# Patient Record
Sex: FEMALE | Race: WHITE | URBAN - METROPOLITAN AREA
[De-identification: names, ages, dates, MRNs, and addresses within clinical notes are randomized per-mention and may not be internally consistent; named-entity substitution may affect disease eponyms.]

---

## 2017-03-22 ENCOUNTER — HOSPITAL ENCOUNTER (OUTPATIENT)
Dept: OTHER | Facility: HOSPITAL | Age: 47
Setting detail: SPECIMEN
Discharge: HOME | End: 2017-03-22
Attending: PODIATRIST | Admitting: PODIATRIST

## 2017-03-22 LAB
BACTERIA SPEC AEROBE CULT: NORMAL
GRAM STN SPEC: NORMAL
Lab: NORMAL
MICRO REPORT STATUS: NORMAL
SPECIMEN SOURCE: NORMAL

## 2017-03-23 LAB
BACTERIA SPEC AEROBE CULT: NORMAL
Lab: NORMAL
MICRO REPORT STATUS: NORMAL
SPECIMEN SOURCE: NORMAL

## 2024-12-09 ENCOUNTER — APPOINTMENT (OUTPATIENT)
Dept: CT IMAGING | Facility: HOSPITAL | Age: 54
End: 2024-12-09
Payer: COMMERCIAL

## 2024-12-09 ENCOUNTER — APPOINTMENT (OUTPATIENT)
Dept: MRI IMAGING | Facility: HOSPITAL | Age: 54
End: 2024-12-09
Payer: COMMERCIAL

## 2024-12-09 ENCOUNTER — HOSPITAL ENCOUNTER (OUTPATIENT)
Facility: HOSPITAL | Age: 54
Setting detail: OBSERVATION
Discharge: HOME | End: 2024-12-11
Attending: EMERGENCY MEDICINE | Admitting: HOSPITALIST
Payer: COMMERCIAL

## 2024-12-09 DIAGNOSIS — R11.10 VOMITING AND DIARRHEA: Primary | ICD-10-CM

## 2024-12-09 DIAGNOSIS — R10.10 ACUTE UPPER ABDOMINAL PAIN: ICD-10-CM

## 2024-12-09 DIAGNOSIS — R19.7 VOMITING AND DIARRHEA: Primary | ICD-10-CM

## 2024-12-09 DIAGNOSIS — E83.42 HYPOMAGNESEMIA: ICD-10-CM

## 2024-12-09 DIAGNOSIS — E87.6 HYPOKALEMIA: ICD-10-CM

## 2024-12-09 DIAGNOSIS — E83.39 HYPOPHOSPHATEMIA: ICD-10-CM

## 2024-12-09 DIAGNOSIS — N28.89 LEFT RENAL MASS: ICD-10-CM

## 2024-12-09 PROBLEM — R11.2 NAUSEA VOMITING AND DIARRHEA: Status: ACTIVE | Noted: 2024-12-09

## 2024-12-09 LAB
ADV 40+41 DNA STL QL NAA+NON-PROBE: NOT DETECTED
ALBUMIN SERPL-MCNC: 1.9 G/DL (ref 3.5–5.2)
ALBUMIN/GLOB SERPL: 1.3 G/DL
ALP SERPL-CCNC: 36 U/L (ref 39–117)
ALT SERPL W P-5'-P-CCNC: 11 U/L (ref 1–33)
ANION GAP SERPL CALCULATED.3IONS-SCNC: 7.6 MMOL/L (ref 5–15)
AST SERPL-CCNC: 15 U/L (ref 1–32)
ASTRO TYP 1-8 RNA STL QL NAA+NON-PROBE: NOT DETECTED
BACTERIA UR QL AUTO: ABNORMAL /HPF
BASOPHILS # BLD AUTO: 0.01 10*3/MM3 (ref 0–0.2)
BASOPHILS NFR BLD AUTO: 0.1 % (ref 0–1.5)
BILIRUB SERPL-MCNC: 0.6 MG/DL (ref 0–1.2)
BILIRUB UR QL STRIP: NEGATIVE
BUN SERPL-MCNC: 7 MG/DL (ref 6–20)
BUN/CREAT SERPL: 17.1 (ref 7–25)
C CAYETANENSIS DNA STL QL NAA+NON-PROBE: NOT DETECTED
C COLI+JEJ+UPSA DNA STL QL NAA+NON-PROBE: NOT DETECTED
CA-I SERPL ISE-MCNC: 1.18 MMOL/L (ref 1.15–1.35)
CALCIUM SPEC-SCNC: 5.6 MG/DL (ref 8.6–10.5)
CHLORIDE SERPL-SCNC: 115 MMOL/L (ref 98–107)
CLARITY UR: CLEAR
CO2 SERPL-SCNC: 19.4 MMOL/L (ref 22–29)
COLOR UR: YELLOW
CREAT SERPL-MCNC: 0.41 MG/DL (ref 0.57–1)
CRYPTOSP DNA STL QL NAA+NON-PROBE: NOT DETECTED
DEPRECATED RDW RBC AUTO: 41.2 FL (ref 37–54)
E HISTOLYT DNA STL QL NAA+NON-PROBE: NOT DETECTED
EAEC PAA PLAS AGGR+AATA ST NAA+NON-PRB: NOT DETECTED
EC STX1+STX2 GENES STL QL NAA+NON-PROBE: NOT DETECTED
EGFRCR SERPLBLD CKD-EPI 2021: 117.8 ML/MIN/1.73
EOSINOPHIL # BLD AUTO: 0.23 10*3/MM3 (ref 0–0.4)
EOSINOPHIL NFR BLD AUTO: 2.1 % (ref 0.3–6.2)
EPEC EAE GENE STL QL NAA+NON-PROBE: NOT DETECTED
ERYTHROCYTE [DISTWIDTH] IN BLOOD BY AUTOMATED COUNT: 12 % (ref 12.3–15.4)
ETEC LTA+ST1A+ST1B TOX ST NAA+NON-PROBE: NOT DETECTED
G LAMBLIA DNA STL QL NAA+NON-PROBE: NOT DETECTED
GLOBULIN UR ELPH-MCNC: 1.5 GM/DL
GLUCOSE SERPL-MCNC: 73 MG/DL (ref 65–99)
GLUCOSE UR STRIP-MCNC: NEGATIVE MG/DL
HCT VFR BLD AUTO: 38.6 % (ref 34–46.6)
HGB BLD-MCNC: 13.2 G/DL (ref 12–15.9)
HGB UR QL STRIP.AUTO: NEGATIVE
HYALINE CASTS UR QL AUTO: ABNORMAL /LPF
IMM GRANULOCYTES # BLD AUTO: 0.07 10*3/MM3 (ref 0–0.05)
IMM GRANULOCYTES NFR BLD AUTO: 0.6 % (ref 0–0.5)
KETONES UR QL STRIP: ABNORMAL
LEUKOCYTE ESTERASE UR QL STRIP.AUTO: ABNORMAL
LIPASE SERPL-CCNC: 44 U/L (ref 13–60)
LYMPHOCYTES # BLD AUTO: 1.46 10*3/MM3 (ref 0.7–3.1)
LYMPHOCYTES NFR BLD AUTO: 13.1 % (ref 19.6–45.3)
MAGNESIUM SERPL-MCNC: 1.2 MG/DL (ref 1.6–2.6)
MCH RBC QN AUTO: 31.6 PG (ref 26.6–33)
MCHC RBC AUTO-ENTMCNC: 34.2 G/DL (ref 31.5–35.7)
MCV RBC AUTO: 92.3 FL (ref 79–97)
MONOCYTES # BLD AUTO: 1.02 10*3/MM3 (ref 0.1–0.9)
MONOCYTES NFR BLD AUTO: 9.1 % (ref 5–12)
NEUTROPHILS NFR BLD AUTO: 75 % (ref 42.7–76)
NEUTROPHILS NFR BLD AUTO: 8.39 10*3/MM3 (ref 1.7–7)
NITRITE UR QL STRIP: NEGATIVE
NOROVIRUS GI+II RNA STL QL NAA+NON-PROBE: NOT DETECTED
NRBC BLD AUTO-RTO: 0 /100 WBC (ref 0–0.2)
P SHIGELLOIDES DNA STL QL NAA+NON-PROBE: NOT DETECTED
PH UR STRIP.AUTO: 6.5 [PH] (ref 5–8)
PHOSPHATE SERPL-MCNC: 2 MG/DL (ref 2.5–4.5)
PHOSPHATE SERPL-MCNC: 3.8 MG/DL (ref 2.5–4.5)
PLATELET # BLD AUTO: 283 10*3/MM3 (ref 140–450)
PMV BLD AUTO: 9.4 FL (ref 6–12)
POTASSIUM SERPL-SCNC: 3.1 MMOL/L (ref 3.5–5.2)
POTASSIUM SERPL-SCNC: 4.6 MMOL/L (ref 3.5–5.2)
PROT SERPL-MCNC: 3.4 G/DL (ref 6–8.5)
PROT UR QL STRIP: NEGATIVE
RBC # BLD AUTO: 4.18 10*6/MM3 (ref 3.77–5.28)
RBC # UR STRIP: ABNORMAL /HPF
REF LAB TEST METHOD: ABNORMAL
RVA RNA STL QL NAA+NON-PROBE: NOT DETECTED
S ENT+BONG DNA STL QL NAA+NON-PROBE: NOT DETECTED
SAPO I+II+IV+V RNA STL QL NAA+NON-PROBE: NOT DETECTED
SHIGELLA SP+EIEC IPAH ST NAA+NON-PROBE: NOT DETECTED
SODIUM SERPL-SCNC: 142 MMOL/L (ref 136–145)
SP GR UR STRIP: >1.03 (ref 1–1.03)
SQUAMOUS #/AREA URNS HPF: ABNORMAL /HPF
UROBILINOGEN UR QL STRIP: ABNORMAL
V CHOL+PARA+VUL DNA STL QL NAA+NON-PROBE: NOT DETECTED
V CHOLERAE DNA STL QL NAA+NON-PROBE: NOT DETECTED
WBC # UR STRIP: ABNORMAL /HPF
WBC NRBC COR # BLD AUTO: 11.18 10*3/MM3 (ref 3.4–10.8)
Y ENTEROCOL DNA STL QL NAA+NON-PROBE: NOT DETECTED

## 2024-12-09 PROCEDURE — 25010000002 HYDROMORPHONE PER 4 MG: Performed by: EMERGENCY MEDICINE

## 2024-12-09 PROCEDURE — 80053 COMPREHEN METABOLIC PANEL: CPT | Performed by: EMERGENCY MEDICINE

## 2024-12-09 PROCEDURE — 96375 TX/PRO/DX INJ NEW DRUG ADDON: CPT

## 2024-12-09 PROCEDURE — G0378 HOSPITAL OBSERVATION PER HR: HCPCS

## 2024-12-09 PROCEDURE — 25010000002 HYDROMORPHONE PER 4 MG: Performed by: HOSPITALIST

## 2024-12-09 PROCEDURE — 84132 ASSAY OF SERUM POTASSIUM: CPT | Performed by: HOSPITALIST

## 2024-12-09 PROCEDURE — 25010000002 ONDANSETRON PER 1 MG: Performed by: HOSPITALIST

## 2024-12-09 PROCEDURE — 84100 ASSAY OF PHOSPHORUS: CPT | Performed by: HOSPITALIST

## 2024-12-09 PROCEDURE — 25010000002 ONDANSETRON PER 1 MG: Performed by: EMERGENCY MEDICINE

## 2024-12-09 PROCEDURE — 83690 ASSAY OF LIPASE: CPT | Performed by: EMERGENCY MEDICINE

## 2024-12-09 PROCEDURE — 84100 ASSAY OF PHOSPHORUS: CPT | Performed by: EMERGENCY MEDICINE

## 2024-12-09 PROCEDURE — 25810000003 SODIUM CHLORIDE 0.9 % SOLUTION: Performed by: HOSPITALIST

## 2024-12-09 PROCEDURE — 96376 TX/PRO/DX INJ SAME DRUG ADON: CPT

## 2024-12-09 PROCEDURE — 25010000002 MORPHINE PER 10 MG: Performed by: EMERGENCY MEDICINE

## 2024-12-09 PROCEDURE — 96366 THER/PROPH/DIAG IV INF ADDON: CPT

## 2024-12-09 PROCEDURE — 87507 IADNA-DNA/RNA PROBE TQ 12-25: CPT | Performed by: HOSPITALIST

## 2024-12-09 PROCEDURE — 83735 ASSAY OF MAGNESIUM: CPT | Performed by: EMERGENCY MEDICINE

## 2024-12-09 PROCEDURE — 82330 ASSAY OF CALCIUM: CPT | Performed by: EMERGENCY MEDICINE

## 2024-12-09 PROCEDURE — 25510000001 IOPAMIDOL 61 % SOLUTION: Performed by: EMERGENCY MEDICINE

## 2024-12-09 PROCEDURE — 85025 COMPLETE CBC W/AUTO DIFF WBC: CPT | Performed by: EMERGENCY MEDICINE

## 2024-12-09 PROCEDURE — 36415 COLL VENOUS BLD VENIPUNCTURE: CPT | Performed by: HOSPITALIST

## 2024-12-09 PROCEDURE — 74183 MRI ABD W/O CNTR FLWD CNTR: CPT

## 2024-12-09 PROCEDURE — 25810000003 SODIUM CHLORIDE 0.9 % SOLUTION: Performed by: EMERGENCY MEDICINE

## 2024-12-09 PROCEDURE — 81001 URINALYSIS AUTO W/SCOPE: CPT | Performed by: EMERGENCY MEDICINE

## 2024-12-09 PROCEDURE — 74177 CT ABD & PELVIS W/CONTRAST: CPT

## 2024-12-09 PROCEDURE — 96361 HYDRATE IV INFUSION ADD-ON: CPT

## 2024-12-09 PROCEDURE — 99285 EMERGENCY DEPT VISIT HI MDM: CPT

## 2024-12-09 PROCEDURE — 25010000002 MAGNESIUM SULFATE 2 GM/50ML SOLUTION: Performed by: HOSPITALIST

## 2024-12-09 PROCEDURE — 96365 THER/PROPH/DIAG IV INF INIT: CPT

## 2024-12-09 RX ORDER — AMOXICILLIN 250 MG
2 CAPSULE ORAL 2 TIMES DAILY PRN
Status: DISCONTINUED | OUTPATIENT
Start: 2024-12-09 | End: 2024-12-11 | Stop reason: HOSPADM

## 2024-12-09 RX ORDER — ERGOCALCIFEROL 1.25 MG/1
50000 CAPSULE, LIQUID FILLED ORAL WEEKLY
COMMUNITY

## 2024-12-09 RX ORDER — HYDROMORPHONE HYDROCHLORIDE 1 MG/ML
0.5 INJECTION, SOLUTION INTRAMUSCULAR; INTRAVENOUS; SUBCUTANEOUS ONCE
Status: COMPLETED | OUTPATIENT
Start: 2024-12-09 | End: 2024-12-09

## 2024-12-09 RX ORDER — POLYETHYLENE GLYCOL 3350 17 G/17G
17 POWDER, FOR SOLUTION ORAL DAILY PRN
Status: DISCONTINUED | OUTPATIENT
Start: 2024-12-09 | End: 2024-12-11 | Stop reason: HOSPADM

## 2024-12-09 RX ORDER — ACETAMINOPHEN 650 MG/1
650 SUPPOSITORY RECTAL EVERY 4 HOURS PRN
Status: DISCONTINUED | OUTPATIENT
Start: 2024-12-09 | End: 2024-12-11 | Stop reason: HOSPADM

## 2024-12-09 RX ORDER — ACETAMINOPHEN 160 MG/5ML
650 SOLUTION ORAL EVERY 4 HOURS PRN
Status: DISCONTINUED | OUTPATIENT
Start: 2024-12-09 | End: 2024-12-11 | Stop reason: HOSPADM

## 2024-12-09 RX ORDER — IOPAMIDOL 612 MG/ML
100 INJECTION, SOLUTION INTRAVASCULAR
Status: COMPLETED | OUTPATIENT
Start: 2024-12-09 | End: 2024-12-09

## 2024-12-09 RX ORDER — SODIUM CHLORIDE 0.9 % (FLUSH) 0.9 %
10 SYRINGE (ML) INJECTION AS NEEDED
Status: DISCONTINUED | OUTPATIENT
Start: 2024-12-09 | End: 2024-12-11 | Stop reason: HOSPADM

## 2024-12-09 RX ORDER — ACETAMINOPHEN 325 MG/1
650 TABLET ORAL EVERY 4 HOURS PRN
Status: DISCONTINUED | OUTPATIENT
Start: 2024-12-09 | End: 2024-12-11 | Stop reason: HOSPADM

## 2024-12-09 RX ORDER — BUTALBITAL, ACETAMINOPHEN AND CAFFEINE 50; 325; 40 MG/1; MG/1; MG/1
1 TABLET ORAL ONCE AS NEEDED
Status: COMPLETED | OUTPATIENT
Start: 2024-12-09 | End: 2024-12-09

## 2024-12-09 RX ORDER — HYDROMORPHONE HYDROCHLORIDE 1 MG/ML
0.5 INJECTION, SOLUTION INTRAMUSCULAR; INTRAVENOUS; SUBCUTANEOUS
Status: DISCONTINUED | OUTPATIENT
Start: 2024-12-09 | End: 2024-12-11 | Stop reason: HOSPADM

## 2024-12-09 RX ORDER — MAGNESIUM SULFATE HEPTAHYDRATE 40 MG/ML
2 INJECTION, SOLUTION INTRAVENOUS
Status: COMPLETED | OUTPATIENT
Start: 2024-12-09 | End: 2024-12-09

## 2024-12-09 RX ORDER — ONDANSETRON 2 MG/ML
4 INJECTION INTRAMUSCULAR; INTRAVENOUS EVERY 6 HOURS PRN
Status: DISCONTINUED | OUTPATIENT
Start: 2024-12-09 | End: 2024-12-11 | Stop reason: HOSPADM

## 2024-12-09 RX ORDER — CYCLOBENZAPRINE HCL 10 MG
10 TABLET ORAL NIGHTLY
COMMUNITY

## 2024-12-09 RX ORDER — ONDANSETRON 2 MG/ML
4 INJECTION INTRAMUSCULAR; INTRAVENOUS ONCE
Status: COMPLETED | OUTPATIENT
Start: 2024-12-09 | End: 2024-12-09

## 2024-12-09 RX ORDER — SODIUM CHLORIDE 9 MG/ML
40 INJECTION, SOLUTION INTRAVENOUS AS NEEDED
Status: DISCONTINUED | OUTPATIENT
Start: 2024-12-09 | End: 2024-12-11 | Stop reason: HOSPADM

## 2024-12-09 RX ORDER — SODIUM CHLORIDE 0.9 % (FLUSH) 0.9 %
10 SYRINGE (ML) INJECTION EVERY 12 HOURS SCHEDULED
Status: DISCONTINUED | OUTPATIENT
Start: 2024-12-09 | End: 2024-12-11 | Stop reason: HOSPADM

## 2024-12-09 RX ORDER — BISACODYL 10 MG
10 SUPPOSITORY, RECTAL RECTAL DAILY PRN
Status: DISCONTINUED | OUTPATIENT
Start: 2024-12-09 | End: 2024-12-11 | Stop reason: HOSPADM

## 2024-12-09 RX ORDER — ONDANSETRON 4 MG/1
4 TABLET, ORALLY DISINTEGRATING ORAL EVERY 6 HOURS PRN
Status: DISCONTINUED | OUTPATIENT
Start: 2024-12-09 | End: 2024-12-11 | Stop reason: HOSPADM

## 2024-12-09 RX ORDER — SODIUM CHLORIDE 9 MG/ML
100 INJECTION, SOLUTION INTRAVENOUS CONTINUOUS
Status: DISCONTINUED | OUTPATIENT
Start: 2024-12-09 | End: 2024-12-10

## 2024-12-09 RX ORDER — MORPHINE SULFATE 2 MG/ML
4 INJECTION, SOLUTION INTRAMUSCULAR; INTRAVENOUS ONCE
Status: COMPLETED | OUTPATIENT
Start: 2024-12-09 | End: 2024-12-09

## 2024-12-09 RX ORDER — BISACODYL 5 MG/1
5 TABLET, DELAYED RELEASE ORAL DAILY PRN
Status: DISCONTINUED | OUTPATIENT
Start: 2024-12-09 | End: 2024-12-11 | Stop reason: HOSPADM

## 2024-12-09 RX ORDER — ASCORBIC ACID 500 MG
500 TABLET ORAL DAILY
COMMUNITY

## 2024-12-09 RX ORDER — POTASSIUM CHLORIDE 750 MG/1
40 TABLET, FILM COATED, EXTENDED RELEASE ORAL
Status: COMPLETED | OUTPATIENT
Start: 2024-12-09 | End: 2024-12-09

## 2024-12-09 RX ADMIN — MAGNESIUM SULFATE HEPTAHYDRATE 2 G: 40 INJECTION, SOLUTION INTRAVENOUS at 10:18

## 2024-12-09 RX ADMIN — POTASSIUM CHLORIDE 40 MEQ: 750 TABLET, EXTENDED RELEASE ORAL at 14:00

## 2024-12-09 RX ADMIN — HYDROMORPHONE HYDROCHLORIDE 0.5 MG: 1 INJECTION, SOLUTION INTRAMUSCULAR; INTRAVENOUS; SUBCUTANEOUS at 13:52

## 2024-12-09 RX ADMIN — SODIUM CHLORIDE 100 ML/HR: 9 INJECTION, SOLUTION INTRAVENOUS at 23:31

## 2024-12-09 RX ADMIN — ACETAMINOPHEN 650 MG: 325 TABLET ORAL at 18:05

## 2024-12-09 RX ADMIN — MAGNESIUM SULFATE HEPTAHYDRATE 2 G: 40 INJECTION, SOLUTION INTRAVENOUS at 15:45

## 2024-12-09 RX ADMIN — Medication 2 PACKET: at 10:18

## 2024-12-09 RX ADMIN — BUTALBITAL, ACETAMINOPHEN, AND CAFFEINE 1 TABLET: 50; 325; 40 TABLET ORAL at 21:26

## 2024-12-09 RX ADMIN — MORPHINE SULFATE 4 MG: 2 INJECTION, SOLUTION INTRAMUSCULAR; INTRAVENOUS at 07:52

## 2024-12-09 RX ADMIN — ONDANSETRON 4 MG: 2 INJECTION, SOLUTION INTRAMUSCULAR; INTRAVENOUS at 07:52

## 2024-12-09 RX ADMIN — MAGNESIUM SULFATE HEPTAHYDRATE 2 G: 40 INJECTION, SOLUTION INTRAVENOUS at 13:33

## 2024-12-09 RX ADMIN — HYDROMORPHONE HYDROCHLORIDE 0.5 MG: 1 INJECTION, SOLUTION INTRAMUSCULAR; INTRAVENOUS; SUBCUTANEOUS at 09:14

## 2024-12-09 RX ADMIN — IOPAMIDOL 81 ML: 612 INJECTION, SOLUTION INTRAVENOUS at 08:31

## 2024-12-09 RX ADMIN — SODIUM CHLORIDE 1000 ML: 9 INJECTION, SOLUTION INTRAVENOUS at 10:18

## 2024-12-09 RX ADMIN — SODIUM CHLORIDE 100 ML/HR: 9 INJECTION, SOLUTION INTRAVENOUS at 13:53

## 2024-12-09 RX ADMIN — ONDANSETRON 4 MG: 2 INJECTION, SOLUTION INTRAMUSCULAR; INTRAVENOUS at 15:45

## 2024-12-09 RX ADMIN — ONDANSETRON 4 MG: 2 INJECTION, SOLUTION INTRAMUSCULAR; INTRAVENOUS at 09:13

## 2024-12-09 RX ADMIN — POTASSIUM CHLORIDE 40 MEQ: 750 TABLET, EXTENDED RELEASE ORAL at 10:18

## 2024-12-09 NOTE — H&P
HISTORY AND PHYSICAL   [facility]        Patient Identification:  Name: Shahnaz Shabazz  Age: 53 y.o.  Sex: female  :  1970  MRN: [id number]                     Primary Care Physician: Kaitlyn Platt APRN    Chief Complaint: Upper abdominal pain with vomiting and diarrhea    History of Present Illness:       The patient  is a 53 y.o. female with a medical history of diverticulosis who presents to the ED from home by EMS c/o acute upper abdominal pain, vomiting, and diarrhea.  Patient began having epigastric discomfort 3 days ago.  Pain worsened yesterday.  Pain is sharp and radiates to the mid back.  Nothing makes the pain better or worse.  Pain is currently moderate in intensity.  She has had several episodes of nonbilious nonbloody vomiting and watery nonbloody diarrhea since yesterday.  Denies sick contacts, fever, chest pain, shortness of breath, flank pain, dysuria, or hematuria.  She has had a cholecystectomy.  She was given IV fluids by EMS.  She also states that she has been taking one of the semaglutide weight toe loss drugs for about 3 or 4 weeks.  The patient was evaluated in the ER and also noted to have some electrolyte disturbances and given some IV fluids and electrolyte replacement and admitted for further evaluation treatment.  She denies eating any raw or uncooked food or impure water source.  Denies any sick contacts with anyone else with GI illness.  CT of abdomen pelvis showing left renal mass which is suspicious for malignancy and radiology is recommending MRI.    Past Medical History:  History reviewed. No pertinent past medical history.  Past Surgical History:  History reviewed. No pertinent surgical history.   Home Meds:  No medications prior to admission.     Current meds    Current Facility-Administered Medications:     Magnesium Standard Dose Replacement - Follow Nurse / BPA Driven Protocol, , Not Applicable, DIEGO, Willian Mares MD    magnesium sulfate 2g/50  mL (PREMIX) infusion, 2 g, Intravenous, Q2H, David Fung MD, Last Rate: 0 mL/hr at 24 1100, 2 g at 24 1333    Phosphorus Replacement - Follow Nurse / BPA Driven Protocol, , Not Applicable, Vishnu CORTEZ William D, MD    potassium chloride (K-DUR,KLOR-CON) ER tablet 40 mEq, 40 mEq, Oral, Q2H, David Fung MD, 40 mEq at 24 1018    Potassium Replacement - Follow Nurse / BPA Driven Protocol, , Not Applicable, Vishnu CORTEZ William D, MD    [COMPLETED] Insert Peripheral IV, , , Once **AND** sodium chloride 0.9 % flush 10 mL, 10 mL, Intravenous, PRVishnu LAIRD William D, MD  Allergies:  Allergies   Allergen Reactions    Penicillins Hives     Immunizations:  Immunization History   Administered Date(s) Administered    COVID-19 (MODERNA) Monovalent Original Booster 2021    COVID-19 (PFIZER) 12YRS+ (COMIRNATY) 10/30/2023, 10/08/2024     Social History:   Social History     Social History Narrative    Not on file     Social History     Socioeconomic History    Marital status:        Family History:  History reviewed. No pertinent family history.     Review of Systems  See history of present illness and past medical history.  Patient denies headache, dizziness, syncope, falls, trauma, change in vision, change in hearing, change in taste, changes in weight, changes in appetite, focal weakness, numbness, or paresthesia.  Patient denies chest pain, palpitations, dyspnea, orthopnea, PND, cough, sinus pressure, rhinorrhea, epistaxis, hemoptysis,  hematemesis, constipation or hematochezia. Denies cold or heat intolerance, polydipsia, polyuria, polyphagia. Denies hematuria, pyuria, dysuria, hesitancy, frequency or urgency.   Denies fever, chills, sweats, night sweats.  Denies missing any routine medications. Remainder of ROS is negative.    Objective:  tMax 24 hrs: Temp (24hrs), Av.2 °F (36.8 °C), Min:97.9 °F (36.6 °C), Max:98.4 °F (36.9 °C)    Vitals Ranges:   Temp:  [97.9 °F (36.6 °C)-98.4 °F  "(36.9 °C)] 97.9 °F (36.6 °C)  Heart Rate:  [71-86] 86  Resp:  [16] 16  BP: (121-129)/(63-93) 121/63      Exam:  /63 (BP Location: Right arm, Patient Position: Lying)   Pulse 86   Temp 97.9 °F (36.6 °C) (Oral)   Resp 16   Ht 157.5 cm (62\")   Wt 74.8 kg (165 lb)   SpO2 97%   BMI 30.18 kg/m²     General Appearance:    Alert, cooperative, no distress, appears stated age   Head:    Normocephalic, without obvious abnormality, atraumatic   Eyes:    PERRL, conjunctivae/corneas clear, EOM's intact, both eyes   Ears:    Normal external ear canals, both ears   Nose:   Nares normal, septum midline, mucosa normal, no drainage    or sinus tenderness   Throat:   Lips, mucosa, and tongue normal   Neck:   Supple, symmetrical, trachea midline, no adenopathy;     thyroid:  no enlargement/tenderness/nodules; no carotid    bruit or JVD   Back:     Symmetric, no curvature, ROM normal, no CVA tenderness   Lungs:     Clear to auscultation bilaterally, respirations unlabored   Chest Wall:    No tenderness or deformity    Heart:    Regular rate and rhythm, S1 and S2 normal, no murmur, rub   or gallop   Abdomen:     Soft, nontender, bowel sounds active all four quadrants,     no masses, no hepatomegaly, no splenomegaly   Extremities:   Extremities normal, atraumatic, no cyanosis or edema   Pulses:   2+ and symmetric all extremities   Skin:   Skin color, texture, turgor normal, no rashes or lesions   Lymph nodes:   Cervical, supraclavicular, and axillary nodes normal   Neurologic:   CNII-XII intact, normal strength, sensation intact throughout      .    Data Review:  Lab Results (last 72 hours)       Procedure Component Value Units Date/Time    Magnesium [[id number]]  (Abnormal) Collected: 12/09/24 0752    Specimen: Blood Updated: 12/09/24 0936     Magnesium 1.2 mg/dL     Phosphorus [[id number]]  (Abnormal) Collected: 12/09/24 0752    Specimen: Blood Updated: 12/09/24 0936     Phosphorus 2.0 mg/dL     Calcium, Ionized [[id number]]  " (Normal) Collected: 12/09/24 0852    Specimen: Blood Updated: 12/09/24 0925     Ionized Calcium 1.18 mmol/L     Narrative:      This test was developed, its performance characteristics determined and judged suitable for clinical purposes by [facility].  The specimen type utilized for this test has not been cleared or approved by the FDA.  The laboratory is regulated under CLIA as qualified to perform high-complexity testing.    Comprehensive Metabolic Panel [[id number]]  (Abnormal) Collected: 12/09/24 0752    Specimen: Blood Updated: 12/09/24 0832     Glucose 73 mg/dL      BUN 7 mg/dL      Creatinine 0.41 mg/dL      Sodium 142 mmol/L      Potassium 3.1 mmol/L      Comment: Slight hemolysis detected by analyzer. Result may be falsely elevated.        Chloride 115 mmol/L      CO2 19.4 mmol/L      Calcium 5.6 mg/dL      Total Protein 3.4 g/dL      Albumin 1.9 g/dL      ALT (SGPT) 11 U/L      AST (SGOT) 15 U/L      Comment: Slight hemolysis detected by analyzer. Result may be falsely elevated.        Alkaline Phosphatase 36 U/L      Total Bilirubin 0.6 mg/dL      Globulin 1.5 gm/dL      A/G Ratio 1.3 g/dL      BUN/Creatinine Ratio 17.1     Anion Gap 7.6 mmol/L      eGFR 117.8 mL/min/1.73     Narrative:      GFR Normal >60  Chronic Kidney Disease <60  Kidney Failure <15      Lipase [[id number]]  (Normal) Collected: 12/09/24 0752    Specimen: Blood Updated: 12/09/24 0825     Lipase 44 U/L     CBC & Differential [[id number]]  (Abnormal) Collected: 12/09/24 0752    Specimen: Blood Updated: 12/09/24 0806    Narrative:      The following orders were created for panel order CBC & Differential.  Procedure                               Abnormality         Status                     ---------                               -----------         ------                     CBC Auto Differential[[id number]]        Abnormal            Final result                 Please view results for these tests on the  individual orders.    CBC Auto Differential [[id number]]  (Abnormal) Collected: 12/09/24 0752    Specimen: Blood Updated: 12/09/24 0806     WBC 11.18 10*3/mm3      RBC 4.18 10*6/mm3      Hemoglobin 13.2 g/dL      Hematocrit 38.6 %      MCV 92.3 fL      MCH 31.6 pg      MCHC 34.2 g/dL      RDW 12.0 %      RDW-SD 41.2 fl      MPV 9.4 fL      Platelets 283 10*3/mm3      Neutrophil % 75.0 %      Lymphocyte % 13.1 %      Monocyte % 9.1 %      Eosinophil % 2.1 %      Basophil % 0.1 %      Immature Grans % 0.6 %      Neutrophils, Absolute 8.39 10*3/mm3      Lymphocytes, Absolute 1.46 10*3/mm3      Monocytes, Absolute 1.02 10*3/mm3      Eosinophils, Absolute 0.23 10*3/mm3      Basophils, Absolute 0.01 10*3/mm3      Immature Grans, Absolute 0.07 10*3/mm3      nRBC 0.0 /100 WBC                      Imaging Results (All)       Procedure Component Value Units Date/Time    CT Abdomen Pelvis With Contrast [[id number]] Collected: 12/09/24 0854     Updated: 12/09/24 0912    Narrative:      CT ABDOMEN PELVIS W CONTRAST-     DATE OF EXAM: 12/9/2024 8:24 AM     INDICATION: Upper abdominal pain, vomiting, diarrhea.     COMPARISON: None available.     TECHNIQUE: Multiple contiguous axial images were acquired through the  abdomen and pelvis following the intravenous administration of 81 mL of  Isovue-300. Reformatted coronal and sagittal sequences were also  reviewed. Radiation dose reduction techniques were utilized, including  automated exposure control and exposure modulation based on body size.     FINDINGS:  Mild elevation of the right hemidiaphragm with mild multifocal bibasilar  subsegmental atelectasis and/or scarring. Partially imaged likely benign  sub-4 mm pulmonary nodule in the left lower lobe (axial series 2 image  1). Status post cholecystectomy. Mild likely postoperative intrahepatic  and extrahepatic biliary duct dilatation. The liver is otherwise  unremarkable. The spleen, pancreas, and adrenal glands are  unremarkable.  Small low-density lesions in the right kidney, measuring up to 1.1 cm in  the upper pole, which are incompletely evaluated but likely represent  benign cysts. Heterogeneous primarily low-attenuation/hypoenhancing mass  in the posterior interpolar left kidney, measuring 3 cm x 2.5 cm in  axial dimensions (axial series 2 image 50) and 3.5 cm craniocaudal  (sagittal series 4 image 153), most consistent with a primary renal  neoplasm/renal cell carcinoma. The urinary bladder is nondistended.  Multiple uterine fibroids with the largest in the right side of the  uterine fundus measuring up to approximately 4.5 cm in diameter. The  adnexa are unremarkable in CT appearance.     The stomach is moderately distended with fluid and air. Mild fluid  within the lumen of the colon, which could reflect a clinical symptom of  diarrhea. Colonic diverticula, without CT evidence of diverticulitis. No  bowel obstruction or significant bowel wall thickening. The appendix is  normal.     No free fluid in the abdomen or pelvis. No free intraperitoneal air. No  pathologically enlarged lymph nodes in the abdomen or pelvis.     Mild multilevel lumbar spondylosis. Mild bilateral hip and SI joint DJD  and mild DJD of the pubic symphysis. No acute osseous abnormality or  concerning osseous lesion.       Impression:         1. Heterogeneous predominantly low-attenuation/hypoenhancing 3.5 cm mass  in the posterior interpolar left kidney, most concerning for a primary  renal neoplasm/renal cell carcinoma. Consider biopsy and/or further  evaluation with dedicated MRI without and with contrast if clinically  indicated.  2. No CT evidence of distant metastatic disease.  3. Mild fluid within the lumen of the colon, which could reflect a  clinical symptom of diarrhea, with mild fluid and air distention of the  stomach.  4. Colonic diverticula, without CT evidence of diverticulitis.  5. Fibroid uterus.     Results were discussed over the  phone with the ordering ER physician,  Dr. Devin Mares, at 9:05 a.m. on 12/9/2024.     This report was finalized on 12/9/2024 9:09 AM by Kwesi Rae MD on  Workstation: [id number]             History reviewed. No pertinent past medical history.    Assessment:  Active Hospital Problems    Diagnosis  POA    **Nausea vomiting and diarrhea [R11.2, R19.7]  Yes    Left renal mass [N28.89]  Unknown    Hypophosphatemia [E83.39]  Unknown    Hypokalemia [E87.6]  Unknown    Acute upper abdominal pain [R10.10]  Unknown      Resolved Hospital Problems   No resolved problems to display.       Plan:  The patient admitted to the hospital and will give some IV fluid hydration and replace electrolytes.  Pain and nausea medicine.  Will get MRI of abdomen to evaluate left renal mass which looks suspicious for renal cancer.  Follow-up on labs.  Will get urine specimen and also get stool for gastrointestinal PCR.    David Fung MD  12/9/2024  13:42 EST

## 2024-12-09 NOTE — PLAN OF CARE
Goal Outcome Evaluation:  Plan of Care Reviewed With: patient        Progress: no change  Outcome Evaluation: pt admitted from ER today, electrolytes treated per protocol orders, pt still having nausea and diarrhea, stool and urine samples sent, tylenol given for headache, MRI ordered, pt anxious about possible cancer diagnosis, up ad jolly

## 2024-12-09 NOTE — ED NOTES
.Nursing report ED to floor  Shahnaz Shabazz  53 y.o.  female    HPI :  HPI  Stated Reason for Visit: abdominal pain    Chief Complaint  Chief Complaint   Patient presents with    Abdominal Pain       Admitting doctor:   David Fung MD    Admitting diagnosis:   The primary encounter diagnosis was Vomiting and diarrhea. Diagnoses of Acute upper abdominal pain, Hypomagnesemia, Hypophosphatemia, Left renal mass, and Hypokalemia were also pertinent to this visit.    Code status:   Current Code Status       Date Active Code Status Order ID Comments User Context       Not on file            Allergies:   Penicillins    Isolation:   No active isolations    Intake and Output    Intake/Output Summary (Last 24 hours) at 12/9/2024 1105  Last data filed at 12/9/2024 0716  Gross per 24 hour   Intake 250 ml   Output --   Net 250 ml       Weight:       12/09/24  0715   Weight: 74.8 kg (165 lb)       Most recent vitals:   Vitals:    12/09/24 0752 12/09/24 0801 12/09/24 0811 12/09/24 0821   BP:       Pulse: 80 85 82 73   Resp:       Temp:       SpO2: 97% 94% 94% 94%   Weight:       Height:           Active LDAs/IV Access:   Lines, Drains & Airways       Active LDAs       Name Placement date Placement time Site Days    Peripheral IV 12/09/24 0714 Right Antecubital 12/09/24 0714  Antecubital  less than 1                    Labs (abnormal labs have a star):   Labs Reviewed   COMPREHENSIVE METABOLIC PANEL - Abnormal; Notable for the following components:       Result Value    Creatinine 0.41 (*)     Potassium 3.1 (*)     Chloride 115 (*)     CO2 19.4 (*)     Calcium 5.6 (*)     Total Protein 3.4 (*)     Albumin 1.9 (*)     Alkaline Phosphatase 36 (*)     All other components within normal limits    Narrative:     GFR Normal >60  Chronic Kidney Disease <60  Kidney Failure <15     CBC WITH AUTO DIFFERENTIAL - Abnormal; Notable for the following components:    WBC 11.18 (*)     RDW 12.0 (*)     Lymphocyte % 13.1 (*)     Immature Grans %  0.6 (*)     Neutrophils, Absolute 8.39 (*)     Monocytes, Absolute 1.02 (*)     Immature Grans, Absolute 0.07 (*)     All other components within normal limits   MAGNESIUM - Abnormal; Notable for the following components:    Magnesium 1.2 (*)     All other components within normal limits   PHOSPHORUS - Abnormal; Notable for the following components:    Phosphorus 2.0 (*)     All other components within normal limits   LIPASE - Normal   CALCIUM, IONIZED - Normal    Narrative:     This test was developed, its performance characteristics determined and judged suitable for clinical purposes by [facility].  The specimen type utilized for this test has not been cleared or approved by the FDA.  The laboratory is regulated under CLIA as qualified to perform high-complexity testing.   URINALYSIS W/ MICROSCOPIC IF INDICATED (NO CULTURE)   PHOSPHORUS   CBC AND DIFFERENTIAL    Narrative:     The following orders were created for panel order CBC & Differential.  Procedure                               Abnormality         Status                     ---------                               -----------         ------                     CBC Auto Differential[[id number]]        Abnormal            Final result                 Please view results for these tests on the individual orders.       EKG:   No orders to display       Meds given in ED:   Medications   sodium chloride 0.9 % flush 10 mL (has no administration in time range)   Phosphorus Replacement - Follow Nurse / BPA Driven Protocol (has no administration in time range)   Magnesium Standard Dose Replacement - Follow Nurse / BPA Driven Protocol (has no administration in time range)   sodium chloride 0.9 % bolus 1,000 mL (1,000 mL Intravenous Incomplete 12/9/24 1018)   Potassium Replacement - Follow Nurse / BPA Driven Protocol (has no administration in time range)   potassium chloride (K-DUR,KLOR-CON) ER tablet 40 mEq (40 mEq Oral Incomplete 12/9/24  1018)   magnesium sulfate 2g/50 mL (PREMIX) infusion (2 g Intravenous Incomplete 12/9/24 1018)   potassium & sodium phosphates (PHOS-NAK) 280-160-250 MG packet 2 packet (2 packets Oral Incomplete 12/9/24 1018)   ondansetron (ZOFRAN) injection 4 mg (4 mg Intravenous Given 12/9/24 0752)   morphine injection 4 mg (4 mg Intravenous Given 12/9/24 0752)   iopamidol (ISOVUE-300) 61 % injection 100 mL (81 mL Intravenous Given 12/9/24 0831)   ondansetron (ZOFRAN) injection 4 mg (4 mg Intravenous Given 12/9/24 0913)   HYDROmorphone (DILAUDID) injection 0.5 mg (0.5 mg Intravenous Given 12/9/24 0914)       Imaging results:  CT Abdomen Pelvis With Contrast    Result Date: 12/9/2024   1. Heterogeneous predominantly low-attenuation/hypoenhancing 3.5 cm mass in the posterior interpolar left kidney, most concerning for a primary renal neoplasm/renal cell carcinoma. Consider biopsy and/or further evaluation with dedicated MRI without and with contrast if clinically indicated. 2. No CT evidence of distant metastatic disease. 3. Mild fluid within the lumen of the colon, which could reflect a clinical symptom of diarrhea, with mild fluid and air distention of the stomach. 4. Colonic diverticula, without CT evidence of diverticulitis. 5. Fibroid uterus.  Results were discussed over the phone with the ordering ER physician, Dr. Devin Mares, at 9:05 a.m. on 12/9/2024.  This report was finalized on 12/9/2024 9:09 AM by Kwesi Rae MD on Workstation: [id number]       Ambulatory status:   - Assist 1    Social issues:   Social History     Socioeconomic History    Marital status:        Peripheral Neurovascular  Peripheral Neurovascular (Adult)  Peripheral Neurovascular WDL: WDL    Neuro Cognitive  Neuro Cognitive (Adult)  Cognitive/Neuro/Behavioral WDL: WDL    Learning  Learning Assessment  Learning Readiness and Ability: no barriers identified  Education Provided  Person Taught: patient    Respiratory  Respiratory  WDL  Respiratory WDL: WDL    Abdominal Pain       Pain Assessments  Pain (Adult)  (0-10) Pain Rating: Rest: 4  (0-10) Pain Rating: Activity: 4    Jose Dawn RN  12/09/24 11:05 EST

## 2024-12-09 NOTE — PROGRESS NOTES
Continued Stay Note  [facility]     Patient Name: Shahnaz Shabazz  MRN: [id number]  Today's Date: 12/9/2024    Admit Date: 12/9/2024    Plan: Home no needs   Discharge Plan       Row Name 12/09/24 1400       Plan    Plan Home no needs    Plan Comments Introduced self and role of CCP. Patient confirmed DC plan is to return to home. Stated she is independent with ADL's and uses no DMEs. Family will assist as needed and will provide transportation at DC. Denies any needs/equipment.                   Discharge Codes    No documentation.                       Radha Hebert, RN

## 2024-12-09 NOTE — ED TRIAGE NOTES
Patient to ED via EMS from home. Patient c/o abdominal pain, upper back pain and nausea x 4 days.

## 2024-12-09 NOTE — ED PROVIDER NOTES
EMERGENCY DEPARTMENT ENCOUNTER    Room Number:  15/15  PCP: Kaitlyn Platt APRN  Historian: Patient, EMS    I initially evaluated the patient at 7:19 AM    HPI:  Chief Complaint: Upper abdominal pain, vomiting, diarrhea  A complete HPI/ROS/PMH/PSH/SH/FH are unobtainable due to: Nothing  Context: Shahnaz Shabazz is a 53 y.o. female with a medical history of diverticulosis who presents to the ED from home by EMS c/o acute upper abdominal pain, vomiting, and diarrhea.  Patient began having epigastric discomfort 3 days ago.  Pain worsened yesterday.  Pain is sharp and radiates to the mid back.  Nothing makes the pain better or worse.  Pain is currently moderate in intensity.  She has had several episodes of nonbilious nonbloody vomiting and watery nonbloody diarrhea since yesterday.  Denies sick contacts, fever, chest pain, shortness of breath, flank pain, dysuria, or hematuria.  She has had a cholecystectomy.  She was given IV fluids by EMS.            PAST MEDICAL HISTORY  Active Ambulatory Problems     Diagnosis Date Noted    No Active Ambulatory Problems     Resolved Ambulatory Problems     Diagnosis Date Noted    No Resolved Ambulatory Problems     No Additional Past Medical History         PAST SURGICAL HISTORY  History reviewed. No pertinent surgical history.      FAMILY HISTORY  History reviewed. No pertinent family history.      SOCIAL HISTORY  Social History     Socioeconomic History    Marital status:          ALLERGIES  Penicillins    REVIEW OF SYSTEMS  Review of Systems  Included in HPI  All systems reviewed and negative except for those discussed in HPI.      PHYSICAL EXAM  ED Triage Vitals [12/09/24 0714]   Temp Heart Rate Resp BP SpO2   98.4 °F (36.9 °C) 75 16 129/81 98 %      Temp src Heart Rate Source Patient Position BP Location FiO2 (%)   -- -- -- -- --       Physical Exam      GENERAL: Awake, alert, oriented x 3.  Well-developed, well-nourished and nontoxic-appearing female.  She appears  uncomfortable  HENT: NCAT, nares patent, dry mucous membranes  EYES: no scleral icterus  CV: regular rhythm, normal rate  RESPIRATORY: normal effort, clear to auscultation bilaterally  ABDOMEN: soft, there is epigastric and right upper quadrant tenderness without rebound or guarding, no CVA tenderness  MUSCULOSKELETAL: Extremities are nontender with full range of motion  NEURO: Speech is normal.  No facial droop.  PSYCH:  calm, cooperative  SKIN: warm, dry    Vital signs and nursing notes reviewed.          LAB RESULTS  Recent Results (from the past 24 hours)   Comprehensive Metabolic Panel    Collection Time: 12/09/24  7:52 AM    Specimen: Blood   Result Value Ref Range    Glucose 73 65 - 99 mg/dL    BUN 7 6 - 20 mg/dL    Creatinine 0.41 (L) 0.57 - 1.00 mg/dL    Sodium 142 136 - 145 mmol/L    Potassium 3.1 (L) 3.5 - 5.2 mmol/L    Chloride 115 (H) 98 - 107 mmol/L    CO2 19.4 (L) 22.0 - 29.0 mmol/L    Calcium 5.6 (C) 8.6 - 10.5 mg/dL    Total Protein 3.4 (L) 6.0 - 8.5 g/dL    Albumin 1.9 (L) 3.5 - 5.2 g/dL    ALT (SGPT) 11 1 - 33 U/L    AST (SGOT) 15 1 - 32 U/L    Alkaline Phosphatase 36 (L) 39 - 117 U/L    Total Bilirubin 0.6 0.0 - 1.2 mg/dL    Globulin 1.5 gm/dL    A/G Ratio 1.3 g/dL    BUN/Creatinine Ratio 17.1 7.0 - 25.0    Anion Gap 7.6 5.0 - 15.0 mmol/L    eGFR 117.8 >60.0 mL/min/1.73   Lipase    Collection Time: 12/09/24  7:52 AM    Specimen: Blood   Result Value Ref Range    Lipase 44 13 - 60 U/L   CBC Auto Differential    Collection Time: 12/09/24  7:52 AM    Specimen: Blood   Result Value Ref Range    WBC 11.18 (H) 3.40 - 10.80 10*3/mm3    RBC 4.18 3.77 - 5.28 10*6/mm3    Hemoglobin 13.2 12.0 - 15.9 g/dL    Hematocrit 38.6 34.0 - 46.6 %    MCV 92.3 79.0 - 97.0 fL    MCH 31.6 26.6 - 33.0 pg    MCHC 34.2 31.5 - 35.7 g/dL    RDW 12.0 (L) 12.3 - 15.4 %    RDW-SD 41.2 37.0 - 54.0 fl    MPV 9.4 6.0 - 12.0 fL    Platelets 283 140 - 450 10*3/mm3    Neutrophil % 75.0 42.7 - 76.0 %    Lymphocyte % 13.1 (L) 19.6 -  45.3 %    Monocyte % 9.1 5.0 - 12.0 %    Eosinophil % 2.1 0.3 - 6.2 %    Basophil % 0.1 0.0 - 1.5 %    Immature Grans % 0.6 (H) 0.0 - 0.5 %    Neutrophils, Absolute 8.39 (H) 1.70 - 7.00 10*3/mm3    Lymphocytes, Absolute 1.46 0.70 - 3.10 10*3/mm3    Monocytes, Absolute 1.02 (H) 0.10 - 0.90 10*3/mm3    Eosinophils, Absolute 0.23 0.00 - 0.40 10*3/mm3    Basophils, Absolute 0.01 0.00 - 0.20 10*3/mm3    Immature Grans, Absolute 0.07 (H) 0.00 - 0.05 10*3/mm3    nRBC 0.0 0.0 - 0.2 /100 WBC   Magnesium    Collection Time: 12/09/24  7:52 AM    Specimen: Blood   Result Value Ref Range    Magnesium 1.2 (L) 1.6 - 2.6 mg/dL   Phosphorus    Collection Time: 12/09/24  7:52 AM    Specimen: Blood   Result Value Ref Range    Phosphorus 2.0 (L) 2.5 - 4.5 mg/dL   Calcium, Ionized    Collection Time: 12/09/24  8:52 AM    Specimen: Blood   Result Value Ref Range    Ionized Calcium 1.18 1.15 - 1.35 mmol/L       Ordered the above labs and reviewed the results.        RADIOLOGY  CT Abdomen Pelvis With Contrast    Result Date: 12/9/2024  CT ABDOMEN PELVIS W CONTRAST-  DATE OF EXAM: 12/9/2024 8:24 AM  INDICATION: Upper abdominal pain, vomiting, diarrhea.  COMPARISON: None available.  TECHNIQUE: Multiple contiguous axial images were acquired through the abdomen and pelvis following the intravenous administration of 81 mL of Isovue-300. Reformatted coronal and sagittal sequences were also reviewed. Radiation dose reduction techniques were utilized, including automated exposure control and exposure modulation based on body size.  FINDINGS: Mild elevation of the right hemidiaphragm with mild multifocal bibasilar subsegmental atelectasis and/or scarring. Partially imaged likely benign sub-4 mm pulmonary nodule in the left lower lobe (axial series 2 image 1). Status post cholecystectomy. Mild likely postoperative intrahepatic and extrahepatic biliary duct dilatation. The liver is otherwise unremarkable. The spleen, pancreas, and adrenal glands are  unremarkable. Small low-density lesions in the right kidney, measuring up to 1.1 cm in the upper pole, which are incompletely evaluated but likely represent benign cysts. Heterogeneous primarily low-attenuation/hypoenhancing mass in the posterior interpolar left kidney, measuring 3 cm x 2.5 cm in axial dimensions (axial series 2 image 50) and 3.5 cm craniocaudal (sagittal series 4 image 153), most consistent with a primary renal neoplasm/renal cell carcinoma. The urinary bladder is nondistended. Multiple uterine fibroids with the largest in the right side of the uterine fundus measuring up to approximately 4.5 cm in diameter. The adnexa are unremarkable in CT appearance.  The stomach is moderately distended with fluid and air. Mild fluid within the lumen of the colon, which could reflect a clinical symptom of diarrhea. Colonic diverticula, without CT evidence of diverticulitis. No bowel obstruction or significant bowel wall thickening. The appendix is normal.  No free fluid in the abdomen or pelvis. No free intraperitoneal air. No pathologically enlarged lymph nodes in the abdomen or pelvis.  Mild multilevel lumbar spondylosis. Mild bilateral hip and SI joint DJD and mild DJD of the pubic symphysis. No acute osseous abnormality or concerning osseous lesion.       1. Heterogeneous predominantly low-attenuation/hypoenhancing 3.5 cm mass in the posterior interpolar left kidney, most concerning for a primary renal neoplasm/renal cell carcinoma. Consider biopsy and/or further evaluation with dedicated MRI without and with contrast if clinically indicated. 2. No CT evidence of distant metastatic disease. 3. Mild fluid within the lumen of the colon, which could reflect a clinical symptom of diarrhea, with mild fluid and air distention of the stomach. 4. Colonic diverticula, without CT evidence of diverticulitis. 5. Fibroid uterus.  Results were discussed over the phone with the ordering ER physician, Dr. Devin Mares, at  9:05 a.m. on 12/9/2024.  This report was finalized on 12/9/2024 9:09 AM by Kwesi Rae MD on Workstation: [id number]       Ordered the above noted radiological studies. Reviewed by me in PACS.            PROCEDURES  Procedures        OUTPATIENT MEDICATION MANAGEMENT:  Current Facility-Administered Medications Ordered in Epic   Medication Dose Route Frequency Provider Last Rate Last Admin    Magnesium Standard Dose Replacement - Follow Nurse / BPA Driven Protocol   Not Applicable PRWillian Hernandez MD        Phosphorus Replacement - Follow Nurse / BPA Driven Protocol   Not Applicable PRWillian Hernandez MD        Potassium Replacement - Follow Nurse / BPA Driven Protocol   Not Applicable Willian Hooper MD        sodium chloride 0.9 % bolus 1,000 mL  1,000 mL Intravenous Once Willian Mares MD        sodium chloride 0.9 % flush 10 mL  10 mL Intravenous PRN Willian Mares MD         No current [facility]-ordered outpatient medications on file.           MEDICATIONS GIVEN IN ER  Medications   sodium chloride 0.9 % flush 10 mL (has no administration in time range)   Phosphorus Replacement - Follow Nurse / BPA Driven Protocol (has no administration in time range)   Magnesium Standard Dose Replacement - Follow Nurse / BPA Driven Protocol (has no administration in time range)   sodium chloride 0.9 % bolus 1,000 mL (has no administration in time range)   Potassium Replacement - Follow Nurse / BPA Driven Protocol (has no administration in time range)   ondansetron (ZOFRAN) injection 4 mg (4 mg Intravenous Given 12/9/24 0752)   morphine injection 4 mg (4 mg Intravenous Given 12/9/24 0752)   iopamidol (ISOVUE-300) 61 % injection 100 mL (81 mL Intravenous Given 12/9/24 0831)   ondansetron (ZOFRAN) injection 4 mg (4 mg Intravenous Given 12/9/24 0913)   HYDROmorphone (DILAUDID) injection 0.5 mg (0.5 mg Intravenous Given 12/9/24 0914)                   MEDICAL DECISION MAKING, PROGRESS, and CONSULTS    All  labs have been independently reviewed by me.  All radiology studies have been reviewed by me and I have also reviewed the radiology report.   EKG's independently viewed and interpreted by me.  Discussion below represents my analysis of pertinent findings related to patient's condition, differential diagnosis, treatment plan and final disposition.      Additional sources:    - Discussed/ obtained information from independent historians: EMS    - External (non-ED) record review: Patient saw her gynecologist in September 2024 for her annual GYN exam.  CT abdomen/pelvis done in April 2019 showed evidence of a prior cholecystectomy, colonic diverticulosis, and uterine fibroids.  Patient does not have any prior ED visits or admissions here.    -Prescription drug monitoring program review:     N/A    - Chronic or social conditions impacting patient care (Social Determinants of Health): None          Orders placed during this visit:  Orders Placed This Encounter   Procedures    CT Abdomen Pelvis With Contrast    Comprehensive Metabolic Panel    Lipase    Urinalysis With Microscopic If Indicated (No Culture) - Urine, Clean Catch    CBC Auto Differential    Calcium, Ionized    Magnesium    Phosphorus    LHA (on-call MD unless specified) Details    Insert Peripheral IV    Initiate Observation Status    CBC & Differential         Additional orders considered but not ordered:          Differential diagnosis includes, but is not limited to:    Differential diagnosis includes but is not limited to:  - hepatobiliary pathology such as cholecystitis, cholangitis, and symptomatic cholelithiasis  - Pancreatitis  - Dyspepsia  - Small bowel obstruction  - Appendicitis  - Diverticulitis  - UTI including pyelonephritis  - Ureteral stone  - Zoster  - Colitis, including infectious and ischemic  - Atypical ACS        Independent interpretation of labs, radiology studies, and discussions with consultants:  ED Course as of 12/09/24 1002   Mon  Dec 09, 2024   0718 BP: 129/81 [WH]   0718 Temp: 98.4 °F (36.9 °C) [WH]   0718 Heart Rate: 75 [WH]   0718 Resp: 16 [WH]   0718 SpO2: 98 % [WH]   0809 WBC(!): 11.18 [WH]   0809 Hemoglobin: 13.2 [WH]   0830 Lipase: 44 [WH]   0832 Glucose: 73 [WH]   0832 Creatinine(!): 0.41 [WH]   0832 Potassium(!): 3.1 [WH]   0832 Chloride(!): 115 [WH]   0832 CO2(!): 19.4 [WH]   0832 Calcium(!!): 5.6 [WH]   0833 Albumin(!): 1.9 [WH]   0834 Corrected calcium is 7.5.  Will obtain ionized calcium as well as magnesium and phosphorus. [WH]   0855 CT abdomen/pelvis personally interpreted by me.  My personal interpretation is: There is fluid in the stomach and bowel.  No bowel obstruction.  No obstructive uropathy [WH]   0858 Patient is now vomiting.  She will be given another dose of Zofran [WH]   0909 CT results discussed with Dr. Cbaan, radiologist.  There is a 3.5 cm left renal mass concerning for carcinoma.  Follow-up is recommended.  There is fluid in the stomach and colon.  There is diverticulosis.  No bowel obstruction.  See dictated report for details.   [WH]   0928 Ionized Calcium: 1.18  On CMP, calcium was falsely low due to low albumin [WH]   0939 Magnesium(!): 1.2 [WH]   0939 Phosphorus(!): 2.0 [WH]   0942 Patient is feeling somewhat better after Dilaudid and Zofran.  Test results and diagnosis were discussed with her.  Shared decision making was discussed and admission was recommended.  She is agreeable with this.   [WH]   0942 Electrolyte replacement protocol will be ordered for hypomagnesemia, hypokalemia, and hypophosphatemia [WH]   0954 Case discussed with Dr. Fung, hospitalist, and he agrees to admit the patient.  Pertinent history, exam findings, test results, ED course, and diagnoses were discussed with him. [WH]   1000 MDM: Patient presented to the ED complaining of upper abdominal pain, vomiting, and diarrhea.  She did not have an acute abdomen.  CT scan showed fluid in the stomach and colon.  There was an  incidental finding of a left renal mass concerning for renal carcinoma.  Patient was found to have multiple electrolyte abnormalities.  Electrolyte replacement protocol has been ordered.  Patient was given IV fluids and IV medications for pain and nausea.  She will be admitted to the hospitalist. [WH]      ED Course User Index  [WH] Willian Mares MD         COMPLEXITY OF CARE  The patient requires admission.      DIAGNOSIS  Final diagnoses:   Vomiting and diarrhea   Acute upper abdominal pain   Hypomagnesemia   Hypophosphatemia   Left renal mass   Hypokalemia         DISPOSITION  ADMISSION    Discussed treatment plan and reason for admission with pt/family and admitting physician.  Pt/family voiced understanding of the plan for admission for further testing/treatment as needed.               Latest Documented Vital Signs:  AS OF 10:02 EST VITALS:    BP - 125/93  HR - 73  TEMP - 98.4 °F (36.9 °C)  O2 SATS - 94%            --    Please note that portions of this were completed with a voice recognition program.       Note Disclaimer: At [facility], we believe that sharing information builds trust and better relationships. You are receiving this note because you are receiving care at [facility] or recently visited. It is possible you will see health information before a provider has talked with you about it. This kind of information can be easy to misunderstand. To help you fully understand what it means for your health, we urge you to discuss this note with your provider.             Willian Mares MD  12/09/24 1002

## 2024-12-10 ENCOUNTER — APPOINTMENT (OUTPATIENT)
Dept: CT IMAGING | Facility: HOSPITAL | Age: 54
End: 2024-12-10
Payer: COMMERCIAL

## 2024-12-10 ENCOUNTER — APPOINTMENT (OUTPATIENT)
Dept: GENERAL RADIOLOGY | Facility: HOSPITAL | Age: 54
End: 2024-12-10
Payer: COMMERCIAL

## 2024-12-10 PROBLEM — R11.2 NAUSEA VOMITING AND DIARRHEA: Status: RESOLVED | Noted: 2024-12-09 | Resolved: 2024-12-10

## 2024-12-10 PROBLEM — R19.7 NAUSEA VOMITING AND DIARRHEA: Status: RESOLVED | Noted: 2024-12-09 | Resolved: 2024-12-10

## 2024-12-10 PROBLEM — E83.39 HYPOPHOSPHATEMIA: Status: RESOLVED | Noted: 2024-12-09 | Resolved: 2024-12-10

## 2024-12-10 PROBLEM — E87.6 HYPOKALEMIA: Status: RESOLVED | Noted: 2024-12-09 | Resolved: 2024-12-10

## 2024-12-10 LAB
ALBUMIN SERPL-MCNC: 2.8 G/DL (ref 3.5–5.2)
ALBUMIN/GLOB SERPL: 1.4 G/DL
ALP SERPL-CCNC: 56 U/L (ref 39–117)
ALT SERPL W P-5'-P-CCNC: 14 U/L (ref 1–33)
ANION GAP SERPL CALCULATED.3IONS-SCNC: 5.7 MMOL/L (ref 5–15)
AST SERPL-CCNC: 13 U/L (ref 1–32)
BASOPHILS # BLD AUTO: 0.01 10*3/MM3 (ref 0–0.2)
BASOPHILS NFR BLD AUTO: 0.1 % (ref 0–1.5)
BILIRUB SERPL-MCNC: 0.8 MG/DL (ref 0–1.2)
BUN SERPL-MCNC: 5 MG/DL (ref 6–20)
BUN/CREAT SERPL: 7.1 (ref 7–25)
CA-I SERPL ISE-MCNC: 1.19 MMOL/L (ref 1.15–1.35)
CALCIUM SPEC-SCNC: 7.8 MG/DL (ref 8.6–10.5)
CHLORIDE SERPL-SCNC: 109 MMOL/L (ref 98–107)
CO2 SERPL-SCNC: 25.3 MMOL/L (ref 22–29)
CREAT SERPL-MCNC: 0.7 MG/DL (ref 0.57–1)
DEPRECATED RDW RBC AUTO: 41.5 FL (ref 37–54)
EGFRCR SERPLBLD CKD-EPI 2021: 103.6 ML/MIN/1.73
EOSINOPHIL # BLD AUTO: 0.43 10*3/MM3 (ref 0–0.4)
EOSINOPHIL NFR BLD AUTO: 5.6 % (ref 0.3–6.2)
ERYTHROCYTE [DISTWIDTH] IN BLOOD BY AUTOMATED COUNT: 12.1 % (ref 12.3–15.4)
GLOBULIN UR ELPH-MCNC: 2 GM/DL
GLUCOSE SERPL-MCNC: 84 MG/DL (ref 65–99)
HCT VFR BLD AUTO: 39.9 % (ref 34–46.6)
HGB BLD-MCNC: 13.4 G/DL (ref 12–15.9)
IMM GRANULOCYTES # BLD AUTO: 0.09 10*3/MM3 (ref 0–0.05)
IMM GRANULOCYTES NFR BLD AUTO: 1.2 % (ref 0–0.5)
LYMPHOCYTES # BLD AUTO: 3.21 10*3/MM3 (ref 0.7–3.1)
LYMPHOCYTES NFR BLD AUTO: 41.7 % (ref 19.6–45.3)
MAGNESIUM SERPL-MCNC: 2.3 MG/DL (ref 1.6–2.6)
MCH RBC QN AUTO: 31.5 PG (ref 26.6–33)
MCHC RBC AUTO-ENTMCNC: 33.6 G/DL (ref 31.5–35.7)
MCV RBC AUTO: 93.7 FL (ref 79–97)
MONOCYTES # BLD AUTO: 0.9 10*3/MM3 (ref 0.1–0.9)
MONOCYTES NFR BLD AUTO: 11.7 % (ref 5–12)
NEUTROPHILS NFR BLD AUTO: 3.06 10*3/MM3 (ref 1.7–7)
NEUTROPHILS NFR BLD AUTO: 39.7 % (ref 42.7–76)
NRBC BLD AUTO-RTO: 0 /100 WBC (ref 0–0.2)
PHOSPHATE SERPL-MCNC: 3.2 MG/DL (ref 2.5–4.5)
PLATELET # BLD AUTO: 313 10*3/MM3 (ref 140–450)
PMV BLD AUTO: 9.6 FL (ref 6–12)
POTASSIUM SERPL-SCNC: 4.4 MMOL/L (ref 3.5–5.2)
PROT SERPL-MCNC: 4.8 G/DL (ref 6–8.5)
RBC # BLD AUTO: 4.26 10*6/MM3 (ref 3.77–5.28)
SODIUM SERPL-SCNC: 140 MMOL/L (ref 136–145)
WBC NRBC COR # BLD AUTO: 7.7 10*3/MM3 (ref 3.4–10.8)

## 2024-12-10 PROCEDURE — 96361 HYDRATE IV INFUSION ADD-ON: CPT

## 2024-12-10 PROCEDURE — G0378 HOSPITAL OBSERVATION PER HR: HCPCS

## 2024-12-10 PROCEDURE — 71260 CT THORAX DX C+: CPT

## 2024-12-10 PROCEDURE — 82330 ASSAY OF CALCIUM: CPT | Performed by: HOSPITALIST

## 2024-12-10 PROCEDURE — 85025 COMPLETE CBC W/AUTO DIFF WBC: CPT | Performed by: HOSPITALIST

## 2024-12-10 PROCEDURE — 83735 ASSAY OF MAGNESIUM: CPT | Performed by: HOSPITALIST

## 2024-12-10 PROCEDURE — 25510000002 GADOBENATE DIMEGLUMINE 529 MG/ML SOLUTION: Performed by: HOSPITALIST

## 2024-12-10 PROCEDURE — 80053 COMPREHEN METABOLIC PANEL: CPT | Performed by: HOSPITALIST

## 2024-12-10 PROCEDURE — A9577 INJ MULTIHANCE: HCPCS | Performed by: HOSPITALIST

## 2024-12-10 PROCEDURE — 25510000001 IOPAMIDOL 61 % SOLUTION: Performed by: STUDENT IN AN ORGANIZED HEALTH CARE EDUCATION/TRAINING PROGRAM

## 2024-12-10 PROCEDURE — 71045 X-RAY EXAM CHEST 1 VIEW: CPT

## 2024-12-10 PROCEDURE — 84100 ASSAY OF PHOSPHORUS: CPT | Performed by: HOSPITALIST

## 2024-12-10 RX ORDER — IOPAMIDOL 612 MG/ML
100 INJECTION, SOLUTION INTRAVASCULAR
Status: COMPLETED | OUTPATIENT
Start: 2024-12-10 | End: 2024-12-10

## 2024-12-10 RX ORDER — CYCLOBENZAPRINE HCL 10 MG
10 TABLET ORAL NIGHTLY
Status: COMPLETED | OUTPATIENT
Start: 2024-12-10 | End: 2024-12-10

## 2024-12-10 RX ADMIN — Medication 2.5 MG: at 20:57

## 2024-12-10 RX ADMIN — CYCLOBENZAPRINE HYDROCHLORIDE 10 MG: 10 TABLET, FILM COATED ORAL at 20:57

## 2024-12-10 RX ADMIN — GADOBENATE DIMEGLUMINE 15 ML: 529 INJECTION, SOLUTION INTRAVENOUS at 00:40

## 2024-12-10 RX ADMIN — Medication 10 ML: at 20:36

## 2024-12-10 RX ADMIN — IOPAMIDOL 75 ML: 612 INJECTION, SOLUTION INTRAVENOUS at 18:28

## 2024-12-10 RX ADMIN — ACETAMINOPHEN 650 MG: 325 TABLET ORAL at 05:19

## 2024-12-10 NOTE — PLAN OF CARE
Goal Outcome Evaluation:  Plan of Care Reviewed With: patient        Progress: no change  Outcome Evaluation: Patient is A&Ox4, VSS. Pt stated she was hungry- given tea and broth wants solids. MRI completed. Up ad jolly. SCDs on throughout ngiht. No complaints of N/V/D. Headache treated with Fioricet. IVFs infusing without difficulty. POC ongoing.

## 2024-12-10 NOTE — PROGRESS NOTES
Name: Shahnaz Shabazz ADMIT: 2024   : 1970  PCP: Kaitlyn Platt APRN    MRN: [id number] LOS: 0 days   AGE/SEX: 53 y.o. female  ROOM: [facility]     Subjective   Subjective   Resting comfortably, denies any further diarrhea.  No new problems today.  Feels hungry.    Objective   Objective   Vital Signs  Temp:  [96.7 °F (35.9 °C)-97.9 °F (36.6 °C)] 97.2 °F (36.2 °C)  Heart Rate:  [68-86] 68  Resp:  [16] 16  BP: ()/(60-76) 99/76  SpO2:  [96 %-97 %] 96 %  on   ;   Device (Oxygen Therapy): room air  Body mass index is 30.18 kg/m².  Physical Exam  Constitutional:       General: She is not in acute distress.  Pulmonary:      Effort: Pulmonary effort is normal. No respiratory distress.      Breath sounds: No stridor.   Skin:     Coloration: Skin is not jaundiced.      Findings: No bruising.   Neurological:      General: No focal deficit present.      Mental Status: She is alert.   Psychiatric:         Mood and Affect: Mood normal.         Behavior: Behavior normal.         Results Review     I reviewed the patient's new clinical results.  Results from last 7 days   Lab Units 12/10/24  0615 24  0752   WBC 10*3/mm3 7.70 11.18*   HEMOGLOBIN g/dL 13.4 13.2   PLATELETS 10*3/mm3 313 283     Results from last 7 days   Lab Units 12/10/24  0615 24  1744 24  0752   SODIUM mmol/L 140  --  142   POTASSIUM mmol/L 4.4 4.6 3.1*   CHLORIDE mmol/L 109*  --  115*   CO2 mmol/L 25.3  --  19.4*   BUN mg/dL 5*  --  7   CREATININE mg/dL 0.70  --  0.41*   GLUCOSE mg/dL 84  --  73   EGFR mL/min/1.73 103.6  --  117.8     Results from last 7 days   Lab Units 12/10/24  0615 24  0752   ALBUMIN g/dL 2.8* 1.9*   BILIRUBIN mg/dL 0.8 0.6   ALK PHOS U/L 56 36*   AST (SGOT) U/L 13 15   ALT (SGPT) U/L 14 11     Results from last 7 days   Lab Units 12/10/24  0615 24  1744 24  0752   CALCIUM mg/dL 7.8*  --  5.6*   ALBUMIN g/dL 2.8*  --  1.9*   MAGNESIUM mg/dL 2.3  --  1.2*   PHOSPHORUS mg/dL 3.2 3.8 2.0*  "      No results found for: \"HGBA1C\", \"POCGLU\"    CT Abdomen Pelvis With Contrast    Result Date: 12/9/2024   1. Heterogeneous predominantly low-attenuation/hypoenhancing 3.5 cm mass in the posterior interpolar left kidney, most concerning for a primary renal neoplasm/renal cell carcinoma. Consider biopsy and/or further evaluation with dedicated MRI without and with contrast if clinically indicated. 2. No CT evidence of distant metastatic disease. 3. Mild fluid within the lumen of the colon, which could reflect a clinical symptom of diarrhea, with mild fluid and air distention of the stomach. 4. Colonic diverticula, without CT evidence of diverticulitis. 5. Fibroid uterus.  Results were discussed over the phone with the ordering ER physician, Dr. Devin Mares, at 9:05 a.m. on 12/9/2024.  This report was finalized on 12/9/2024 9:09 AM by Kwesi Rae MD on Workstation: [id number]     Scheduled Medications  sodium chloride, 10 mL, Intravenous, Q12H    Infusions   Diet  Diet: Regular/House; Fluid Consistency: Thin (IDDSI 0)       Assessment/Plan     Active Hospital Problems    Diagnosis  POA    **Nausea vomiting and diarrhea [R11.2, R19.7]  Yes    Left renal mass [N28.89]  Unknown    Hypophosphatemia [E83.39]  Unknown    Hypokalemia [E87.6]  Unknown    Acute upper abdominal pain [R10.10]  Unknown      Resolved Hospital Problems   No resolved problems to display.       53 y.o. female admitted with Nausea vomiting and diarrhea.      12/10/24  Will advance her diet.  Consult urology given MRI consistent with RCC.    L renal mass, suspected RCC based off imaging findings  -3 x 3 cm heterogenous mass at the midportion of the left kidney, likely represents RCC; no retroperitoneal lymphadenopathy; CT did not show any evidence of metastatic disease  -ALP WNL  -Urology consulted    N/V/D, resolved  -suspected viral gastroenteritis  -GI PCR negative  -dc IVF, advance diet         DVT prophylaxis: SCDs  Discussed with " patient.  Anticipated discharge home, when cleared by consultants            Chava Schuler MD  [facility]  12/10/24  11:54 EST

## 2024-12-10 NOTE — CONSULTS
FIRST [facility]OLOG[facility] CONSULT      Patient Identification:  NAME:  Shahnaz Shabazz  Age:  53 y.o.   Sex:  female   :  1970   MRN:  [id number]     Chief complaint: Abdominal pain    History of present illness:      Pt is a 53 y.o. female that presented to the ER on 24 with complaints of abdominal pain, nausea and vomiting x 3 days. Urology consulted for evaluation and management of a left renal mass found on CT scan. Patient eduardo any personal or known family history of renal or bladder cancer. No prior smoking history. Denies any associated flank pain, or gross hematuria.     In hospital:  -AVSS, good UOP  -WBC - 7.70  -Creat - 0.70  -UA - Moderate LE, negative nitrites, negative blood, negative protein, no RBC, 3-5 WBC, no bacteria    -CT -  Independently reviewed - Heterogeneous 3.5 cm left endophytic renal mass    Asked to see    Past medical history:  History reviewed. No pertinent past medical history.    Past surgical history:  Past Surgical History:   Procedure Laterality Date    CHOLECYSTECTOMY      TONSILLECTOMY      TUBAL ABDOMINAL LIGATION         Allergies:  Penicillins    Home medications:  Medications Prior to Admission   Medication Sig Dispense Refill Last Dose/Taking    ascorbic acid (VITAMIN C) 500 MG tablet Take 1 tablet by mouth Daily.   2024    cyclobenzaprine (FLEXERIL) 10 MG tablet Take 1 tablet by mouth Every Night.   2024    vitamin D (ERGOCALCIFEROL) 1.25 MG (00265 UT) capsule capsule Take 1 capsule by mouth 1 (One) Time Per Week.   2024        Hospital medications:  sodium chloride, 10 mL, Intravenous, Q12H           acetaminophen **OR** acetaminophen **OR** acetaminophen    senna-docusate sodium **AND** polyethylene glycol **AND** bisacodyl **AND** bisacodyl    Calcium Replacement - Follow Nurse / BPA Driven Protocol    HYDROmorphone    Magnesium Standard Dose Replacement - Follow Nurse / BPA Driven Protocol    ondansetron ODT **OR** ondansetron    Phosphorus  Replacement - Follow Nurse / BPA Driven Protocol    Potassium Replacement - Follow Nurse / BPA Driven Protocol    [COMPLETED] Insert Peripheral IV **AND** sodium chloride    sodium chloride    sodium chloride    Family history:  History reviewed. No pertinent family history.    Social history:  Social History     Tobacco Use    Smoking status: Never    Smokeless tobacco: Never   Vaping Use    Vaping status: Never Used   Substance Use Topics    Alcohol use: Yes     Comment: socially, once a month    Drug use: Never       Review of systems:      12 point negative except as in HPI    Objective:  TMax 24 hours:   Temp (24hrs), Av.3 °F (36.3 °C), Min:96.7 °F (35.9 °C), Max:97.9 °F (36.6 °C)      Vitals Ranges:   Temp:  [96.7 °F (35.9 °C)-97.9 °F (36.6 °C)] 97.2 °F (36.2 °C)  Heart Rate:  [68-86] 68  Resp:  [16] 16  BP: ()/(60-76) 99/76    Intake/Output Last 3 shifts:  I/O last 3 completed shifts:  In: 2263.3 [I.V.:1263.3; IV Piggyback:1000]  Out: 850 [Urine:850]     Physical Exam:    General Appearance:    Alert, cooperative, NAD   Back:     No CVA tenderness   Abdomen:     Soft, NDNT   :    Pelvic not performed   Neuro/Psych:   Orientation intact, mood/affect pleasant       Results review:   I reviewed the patient's new clinical results.    Data review:  Lab Results (last 24 hours)       Procedure Component Value Units Date/Time    CBC Auto Differential [[id number]]  (Abnormal) Collected: 12/10/24 0615    Specimen: Blood Updated: 12/10/24 075     WBC 7.70 10*3/mm3      RBC 4.26 10*6/mm3      Hemoglobin 13.4 g/dL      Hematocrit 39.9 %      MCV 93.7 fL      MCH 31.5 pg      MCHC 33.6 g/dL      RDW 12.1 %      RDW-SD 41.5 fl      MPV 9.6 fL      Platelets 313 10*3/mm3      Neutrophil % 39.7 %      Lymphocyte % 41.7 %      Monocyte % 11.7 %      Eosinophil % 5.6 %      Basophil % 0.1 %      Immature Grans % 1.2 %      Neutrophils, Absolute 3.06 10*3/mm3      Lymphocytes, Absolute 3.21 10*3/mm3      Monocytes,  Absolute 0.90 10*3/mm3      Eosinophils, Absolute 0.43 10*3/mm3      Basophils, Absolute 0.01 10*3/mm3      Immature Grans, Absolute 0.09 10*3/mm3      nRBC 0.0 /100 WBC     Magnesium [[id number]]  (Normal) Collected: 12/10/24 0615    Specimen: Blood Updated: 12/10/24 0745     Magnesium 2.3 mg/dL     Comprehensive Metabolic Panel [[id number]]  (Abnormal) Collected: 12/10/24 0615    Specimen: Blood Updated: 12/10/24 0745     Glucose 84 mg/dL      BUN 5 mg/dL      Creatinine 0.70 mg/dL      Sodium 140 mmol/L      Potassium 4.4 mmol/L      Chloride 109 mmol/L      CO2 25.3 mmol/L      Calcium 7.8 mg/dL      Total Protein 4.8 g/dL      Albumin 2.8 g/dL      ALT (SGPT) 14 U/L      AST (SGOT) 13 U/L      Alkaline Phosphatase 56 U/L      Total Bilirubin 0.8 mg/dL      Globulin 2.0 gm/dL      A/G Ratio 1.4 g/dL      BUN/Creatinine Ratio 7.1     Anion Gap 5.7 mmol/L      eGFR 103.6 mL/min/1.73     Narrative:      GFR Categories in Chronic Kidney Disease (CKD)      GFR Category          GFR (mL/min/1.73)    Interpretation  G1                     90 or greater         Normal or high (1)  G2                      60-89                Mild decrease (1)  G3a                   45-59                Mild to moderate decrease  G3b                   30-44                Moderate to severe decrease  G4                    15-29                Severe decrease  G5                    14 or less           Kidney failure          (1)In the absence of evidence of kidney disease, neither GFR category G1 or G2 fulfill the criteria for CKD.    eGFR calculation 2021 CKD-EPI creatinine equation, which does not include race as a factor    Phosphorus [[id number]]  (Normal) Collected: 12/10/24 0615    Specimen: Blood Updated: 12/10/24 0718     Phosphorus 3.2 mg/dL     Calcium, Ionized [[id number]]  (Normal) Collected: 12/10/24 0615    Specimen: Blood Updated: 12/10/24 0704     Ionized Calcium 1.19 mmol/L     Narrative:      This test was developed, its  performance characteristics determined and judged suitable for clinical purposes by [facility].  The specimen type utilized for this test has not been cleared or approved by the FDA.  The laboratory is regulated under CLIA as qualified to perform high-complexity testing.    Gastrointestinal Panel, PCR - Stool, Per Rectum [[id number]]  (Normal) Collected: 12/09/24 1802    Specimen: Stool from Per Rectum Updated: 12/09/24 2038     Campylobacter Not Detected     Plesiomonas shigelloides Not Detected     Salmonella Not Detected     Vibrio Not Detected     Vibrio cholerae Not Detected     Yersinia enterocolitica Not Detected     Enteroaggregative E. coli (EAEC) Not Detected     Enteropathogenic E. coli (EPEC) Not Detected     Enterotoxigenic E. coli (ETEC) lt/st Not Detected     Shiga-like toxin-producing E. coli (STEC) stx1/stx2 Not Detected     Shigella/Enteroinvasive E. coli (EIEC) Not Detected     Cryptosporidium Not Detected     Cyclospora cayetanensis Not Detected     Entamoeba histolytica Not Detected     Giardia lamblia Not Detected     Adenovirus F40/41 Not Detected     Astrovirus Not Detected     Norovirus GI/GII Not Detected     Rotavirus A Not Detected     Sapovirus (I, II, IV or V) Not Detected    Narrative:      If Aeromonas, Staphylococcus aureus or Bacillus cereus are suspected, please order [id number]: Stool Culture, Aeromonas, S aureus, B Cereus.    Phosphorus [[id number]]  (Normal) Collected: 12/09/24 1744    Specimen: Blood Updated: 12/09/24 1843     Phosphorus 3.8 mg/dL     Potassium [[id number]]  (Normal) Collected: 12/09/24 1744    Specimen: Blood Updated: 12/09/24 1843     Potassium 4.6 mmol/L      Comment: Slight hemolysis detected by analyzer. Result may be falsely elevated.       Urinalysis, Microscopic Only - Urine, Clean Catch [[id number]]  (Abnormal) Collected: 12/09/24 1725    Specimen: Urine, Clean Catch Updated: 12/09/24 1805     RBC, UA None Seen /HPF      WBC, UA 3-5  /HPF      Bacteria, UA None Seen /HPF      Squamous Epithelial Cells, UA 3-6 /HPF      Hyaline Casts, UA None Seen /LPF      Methodology Manual Light Microscopy    Urinalysis With Microscopic If Indicated (No Culture) - Urine, Clean Catch [[id number]]  (Abnormal) Collected: 12/09/24 1725    Specimen: Urine, Clean Catch Updated: 12/09/24 1745     Color, UA Yellow     Appearance, UA Clear     pH, UA 6.5     Specific Gravity, UA >1.030     Glucose, UA Negative     Ketones, UA 15 mg/dL (1+)     Bilirubin, UA Negative     Blood, UA Negative     Protein, UA Negative     Leuk Esterase, UA Moderate (2+)     Nitrite, UA Negative     Urobilinogen, UA 0.2 E.U./dL             Imaging:  Imaging Results (Last 24 Hours)       Procedure Component Value Units Date/Time    MRI Abdomen With & Without Contrast [[id number]] Collected: 12/10/24 0710     Updated: 12/10/24 0710    Narrative:      MRI ABDOMEN W WO CONTRAST-     HISTORY: 53-year-old female with a left renal mass.     TECHNIQUE: Routine abdominal MRI was performed without and with IV  contrast. Compared with CT abdomen 12/09/2024.     FINDINGS:  1. There is a 3.0 x 3.0 cm enhancing heterogeneous mass at the  midportion of the left kidney which likely represents renal cell  carcinoma. There is no retroperitoneal lymphadenopathy.     2. Right kidney appears unremarkable other than 2 cysts which measure  approximately 1 cm. Adrenals appear unremarkable.                   Assessment:     Left renal mass    Plan:   - No acute urologic intervention planned during admission.   - CXR for staging purposes  - Recommend outpatient follow-up with Dr. Carvalho in the next 2-3 weeks to discuss total nephrectomy- our office will call patient directly to schedule  - Urology will sign off; please call with any questions/concerns or clinical change     KATEY Mckeon  12/10/24  13:20 EST    Plan reviewed and discussed with Dr. Carvalho

## 2024-12-11 VITALS
DIASTOLIC BLOOD PRESSURE: 62 MMHG | OXYGEN SATURATION: 96 % | SYSTOLIC BLOOD PRESSURE: 98 MMHG | BODY MASS INDEX: 30.36 KG/M2 | RESPIRATION RATE: 16 BRPM | HEART RATE: 72 BPM | TEMPERATURE: 97.4 F | WEIGHT: 165 LBS | HEIGHT: 62 IN

## 2024-12-11 PROCEDURE — G0378 HOSPITAL OBSERVATION PER HR: HCPCS

## 2024-12-11 RX ADMIN — Medication 10 ML: at 09:39

## 2024-12-11 NOTE — PLAN OF CARE
Goal Outcome Evaluation:  Plan of Care Reviewed With: patient        Progress: improving  Outcome Evaluation: VSS: Pt alert and oriented; PRN sleep aid given with no help; ambulating to the br; possible d/c today; wctm

## 2024-12-11 NOTE — PLAN OF CARE
Goal Outcome Evaluation:           Progress: improving  Outcome Evaluation: VSS, no n/v/diarrhea today, Chest x-ray and CT chest done, SL, tolerating regular diet, patient hopeful for d/c tomorrow. Urology saw patient today, patient updated on plan of care.

## 2024-12-11 NOTE — PLAN OF CARE
Goal Outcome Evaluation: Patient A&O. PIV removed. Patient aware to make follow up appointments. MD to come to unit to discuss CT results with patient before discharge. Plan of care will continue.

## 2024-12-11 NOTE — DISCHARGE SUMMARY
"    Patient Name: Shahnaz Shabazz  : 1970  MRN: [id number]    Date of Admission: 2024  Date of Discharge:  2024  Primary Care Physician: Kaitlyn Platt APRN      Chief Complaint:   Abdominal Pain      Discharge Diagnoses     Active Hospital Problems    Diagnosis  POA    Left renal mass [N28.89]  Unknown    Acute upper abdominal pain [R10.10]  Unknown      Resolved Hospital Problems    Diagnosis Date Resolved POA    **Nausea vomiting and diarrhea [R11.2, R19.7] 12/10/2024 Yes    Hypophosphatemia [E83.39] 12/10/2024 Unknown    Hypokalemia [E87.6] 12/10/2024 Unknown        Admitting HPI     \"  The patient  is a 53 y.o. female with a medical history of diverticulosis who presents to the ED from home by EMS c/o acute upper abdominal pain, vomiting, and diarrhea.  Patient began having epigastric discomfort 3 days ago.  Pain worsened yesterday.  Pain is sharp and radiates to the mid back.  Nothing makes the pain better or worse.  Pain is currently moderate in intensity.  She has had several episodes of nonbilious nonbloody vomiting and watery nonbloody diarrhea since yesterday.  Denies sick contacts, fever, chest pain, shortness of breath, flank pain, dysuria, or hematuria.  She has had a cholecystectomy.  She was given IV fluids by EMS.  She also states that she has been taking one of the semaglutide weight toe loss drugs for about 3 or 4 weeks.  The patient was evaluated in the ER and also noted to have some electrolyte disturbances and given some IV fluids and electrolyte replacement and admitted for further evaluation treatment.  She denies eating any raw or uncooked food or impure water source.  Denies any sick contacts with anyone else with GI illness.  CT of abdomen pelvis showing left renal mass which is suspicious for malignancy and radiology is recommending MRI.  \"    Hospital Course     Pt admitted for nausea vomiting and abdominal pain, consistent with viral gastroenteritis which improved " with supportive care.  CT obtained on admission showed a left renal mass and she underwent MRI which showed findings consistent with RCC.  She was seen in consultation with urology who recommend follow-up in 2 to 3 weeks as an outpatient to discuss nephrectomy.  CT chest showed a small 3 mm nodule of uncertain significance.  Recommendations made to repeat CT chest with contrast in 2 months to evaluate for any changes.  Outpatient referral for oncology has been placed for monitoring of this.  She is symptomatically improved and stable for discharge.     Discharge Plan     L renal mass, suspected RCC based off imaging findings  -3 x 3 cm heterogenous mass at the midportion of the left kidney, likely represents RCC; no retroperitoneal lymphadenopathy  -staging CT C/A/P showed single 3mm pulm nodule, repeat CT Chest WITH recommended in 2-3 mo (around 2/10/24)  -ALP WNL  -Urology consulted- f/u in 2-3 wks to discuss nephrectomy     N/V/D, resolved  -suspected viral gastroenteritis  -GI PCR negative    Day of Discharge     Physical Exam:  Temp:  [97.1 °F (36.2 °C)-97.5 °F (36.4 °C)] 97.4 °F (36.3 °C)  Heart Rate:  [72-78] 72  Resp:  [16] 16  BP: ()/(62-78) 98/62  Body mass index is 30.18 kg/m².  Physical Exam  Constitutional:       General: She is not in acute distress.  Pulmonary:      Effort: Pulmonary effort is normal. No respiratory distress.      Breath sounds: No stridor.   Skin:     Coloration: Skin is not jaundiced.      Findings: No bruising.   Neurological:      General: No focal deficit present.      Mental Status: She is alert and oriented to person, place, and time.   Psychiatric:         Mood and Affect: Mood normal.         Behavior: Behavior normal.         Thought Content: Thought content normal.         Judgment: Judgment normal.         Consultants     Consult Orders (all) (From admission, onward)       Start     Ordered    12/10/24 1116  Inpatient Urology Consult  Once        Specialty:  Urology   Provider:  Louie Koroma MD    12/10/24 1115    12/09/24 0944  [facility] (on-call MD unless specified) Details  Once        Specialty:  Hospitalist  Provider:  David Fung MD    12/09/24 0943                  Procedures     * Surgery not found *      Imaging Results (All)       Procedure Component Value Units Date/Time    CT Chest With Contrast Diagnostic [[id number]] Collected: 12/10/24 1920     Updated: 12/10/24 1928    Narrative:      CT CHEST W CONTRAST DIAGNOSTIC-     INDICATIONS: Renal cell cancer staging     TECHNIQUE: Radiation dose reduction techniques were utilized, including  automated exposure control and exposure modulation based on body size.  ENHANCED CHEST CT     COMPARISON: None available      FINDINGS:           The heart size is normal without pericardial effusion. No prominent  coronary arterial calcifications are noted. An 8 mm short axis right  paratracheal lymph node is seen on axial image 27. A few other  subcentimeter short axis mediastinal lymph nodes are noted.     The airways appear clear.     No pleural effusion or pneumothorax.     The lungs show no focal pulmonary consolidation or mass. Likely scarring  and atelectasis in both lungs. An indeterminate left lower lobe nodule  on axial image 60 measures 3 mm, possibility of metastatic disease not  excluded in this clinical setting, CT follow-up in 2 to 3 months  advised.     Upper abdominal structures appear unremarkable.     Degenerative changes are seen in the spine. No acute fracture is  identified.       Impression:         A small indeterminate left lower lobe pulmonary nodule is nonspecific,  CT follow-up recommended as described.     This report was finalized on 12/10/2024 7:25 PM by Dr. Omi Duarte M.D on Workstation: [id number]       XR Chest 1 View [[id number]] Collected: 12/10/24 1500     Updated: 12/10/24 1505    Narrative:      AP THE CHEST     HISTORY: Renal cell carcinoma, staging     COMPARISON: 2/6/2016      FINDINGS: Lung fields appear clear. Heart size within normal limits. No  acute bony abnormality.       Impression:      No radiographic evidence of metastatic disease, however would recommend  a CT chest with IV contrast for more detailed evaluation for staging  purposes           This report was finalized on 12/10/2024 3:02 PM by Dr. Jake Koroma M.D on Workstation: [id number]       MRI Abdomen With & Without Contrast [[id number]] Collected: 12/10/24 0710     Updated: 12/10/24 0710    Narrative:      MRI ABDOMEN W WO CONTRAST-     HISTORY: 53-year-old female with a left renal mass.     TECHNIQUE: Routine abdominal MRI was performed without and with IV  contrast. Compared with CT abdomen 12/09/2024.     FINDINGS:  1. There is a 3.0 x 3.0 cm enhancing heterogeneous mass at the  midportion of the left kidney which likely represents renal cell  carcinoma. There is no retroperitoneal lymphadenopathy.     2. Right kidney appears unremarkable other than 2 cysts which measure  approximately 1 cm. Adrenals appear unremarkable.          CT Abdomen Pelvis With Contrast [[id number]] Collected: 12/09/24 0854     Updated: 12/09/24 0912    Narrative:      CT ABDOMEN PELVIS W CONTRAST-     DATE OF EXAM: 12/9/2024 8:24 AM     INDICATION: Upper abdominal pain, vomiting, diarrhea.     COMPARISON: None available.     TECHNIQUE: Multiple contiguous axial images were acquired through the  abdomen and pelvis following the intravenous administration of 81 mL of  Isovue-300. Reformatted coronal and sagittal sequences were also  reviewed. Radiation dose reduction techniques were utilized, including  automated exposure control and exposure modulation based on body size.     FINDINGS:  Mild elevation of the right hemidiaphragm with mild multifocal bibasilar  subsegmental atelectasis and/or scarring. Partially imaged likely benign  sub-4 mm pulmonary nodule in the left lower lobe (axial series 2 image  1). Status post cholecystectomy. Mild  likely postoperative intrahepatic  and extrahepatic biliary duct dilatation. The liver is otherwise  unremarkable. The spleen, pancreas, and adrenal glands are unremarkable.  Small low-density lesions in the right kidney, measuring up to 1.1 cm in  the upper pole, which are incompletely evaluated but likely represent  benign cysts. Heterogeneous primarily low-attenuation/hypoenhancing mass  in the posterior interpolar left kidney, measuring 3 cm x 2.5 cm in  axial dimensions (axial series 2 image 50) and 3.5 cm craniocaudal  (sagittal series 4 image 153), most consistent with a primary renal  neoplasm/renal cell carcinoma. The urinary bladder is nondistended.  Multiple uterine fibroids with the largest in the right side of the  uterine fundus measuring up to approximately 4.5 cm in diameter. The  adnexa are unremarkable in CT appearance.     The stomach is moderately distended with fluid and air. Mild fluid  within the lumen of the colon, which could reflect a clinical symptom of  diarrhea. Colonic diverticula, without CT evidence of diverticulitis. No  bowel obstruction or significant bowel wall thickening. The appendix is  normal.     No free fluid in the abdomen or pelvis. No free intraperitoneal air. No  pathologically enlarged lymph nodes in the abdomen or pelvis.     Mild multilevel lumbar spondylosis. Mild bilateral hip and SI joint DJD  and mild DJD of the pubic symphysis. No acute osseous abnormality or  concerning osseous lesion.       Impression:         1. Heterogeneous predominantly low-attenuation/hypoenhancing 3.5 cm mass  in the posterior interpolar left kidney, most concerning for a primary  renal neoplasm/renal cell carcinoma. Consider biopsy and/or further  evaluation with dedicated MRI without and with contrast if clinically  indicated.  2. No CT evidence of distant metastatic disease.  3. Mild fluid within the lumen of the colon, which could reflect a  clinical symptom of diarrhea, with mild  "fluid and air distention of the  stomach.  4. Colonic diverticula, without CT evidence of diverticulitis.  5. Fibroid uterus.     Results were discussed over the phone with the ordering ER physician,  Dr. Devin Mares, at 9:05 a.m. on 12/9/2024.     This report was finalized on 12/9/2024 9:09 AM by Kwesi Rae MD on  Workstation: [id number]                 Pertinent Labs     Results from last 7 days   Lab Units 12/10/24  0615 12/09/24  0752   WBC 10*3/mm3 7.70 11.18*   HEMOGLOBIN g/dL 13.4 13.2   PLATELETS 10*3/mm3 313 283     Results from last 7 days   Lab Units 12/10/24  0615 12/09/24  1744 12/09/24  0752   SODIUM mmol/L 140  --  142   POTASSIUM mmol/L 4.4 4.6 3.1*   CHLORIDE mmol/L 109*  --  115*   CO2 mmol/L 25.3  --  19.4*   BUN mg/dL 5*  --  7   CREATININE mg/dL 0.70  --  0.41*   GLUCOSE mg/dL 84  --  73   EGFR mL/min/1.73 103.6  --  117.8     Results from last 7 days   Lab Units 12/10/24  0615 12/09/24  0752   ALBUMIN g/dL 2.8* 1.9*   BILIRUBIN mg/dL 0.8 0.6   ALK PHOS U/L 56 36*   AST (SGOT) U/L 13 15   ALT (SGPT) U/L 14 11     Results from last 7 days   Lab Units 12/10/24  0615 12/09/24  1744 12/09/24  0752   CALCIUM mg/dL 7.8*  --  5.6*   ALBUMIN g/dL 2.8*  --  1.9*   MAGNESIUM mg/dL 2.3  --  1.2*   PHOSPHORUS mg/dL 3.2 3.8 2.0*     Results from last 7 days   Lab Units 12/09/24  0752   LIPASE U/L 44             Invalid input(s): \"LDLCALC\"          Test Results Pending at Discharge       Discharge Details        Discharge Medications        Continue These Medications        Instructions Start Date   ascorbic acid 500 MG tablet  Commonly known as: VITAMIN C   500 mg, Daily      cyclobenzaprine 10 MG tablet  Commonly known as: FLEXERIL   10 mg, Nightly      vitamin D 1.25 MG (72955 UT) capsule capsule  Commonly known as: ERGOCALCIFEROL   50,000 Units, Weekly               Allergies   Allergen Reactions    Penicillins Hives       Discharge Disposition:  Home or Self Care      Discharge Diet:  Diet Order "   Procedures    Diet: Regular/House; Fluid Consistency: Thin (IDDSI 0)       Discharge Activity:   Activity Instructions       Activity as Tolerated              CODE STATUS:    Code Status and Medical Interventions: CPR (Attempt to Resuscitate); Full Support   Ordered at: 12/09/24 1345     Level Of Support Discussed With:    Patient     Code Status (Patient has no pulse and is not breathing):    CPR (Attempt to Resuscitate)     Medical Interventions (Patient has pulse or is breathing):    Full Support       No future appointments.  Additional Instructions for the Follow-ups that You Need to Schedule       Ambulatory Referral to Hematology / Oncology   As directed      Left RCC. CT Chest W with 3mm nodule, recommendations for repeat CT Chest W in 2mo (around 2/10/25)    Order Comments: Left RCC. CT Chest W with 3mm nodule, recommendations for repeat CT Chest W in 2mo (around 2/10/25)                Follow-up Information       Terence Carvalho MD Follow up in 2 week(s).    Specialty: Urology  Contact information:  [address] [place] [address] [phone]               Kaitlyn Platt APRN Follow up in 1 week(s).    Specialty: Family Medicine  Contact information:  [address] [place] [address]  [phone]               Repeat CT Chest WITH contrast Follow up in 2 month(s).    Why: Repeat CT Scan recommended: An indeterminate left lower lobe nodule  on axial image 60 measures 3 mm, CT follow-up in 2 to 3 months  advised.             [facility] .    Specialty: Oncology  Contact information:  [address] [address] [phone]  Additional information:  Phone Number: [phone]                           Additional Instructions for the Follow-ups that You Need to Schedule       Ambulatory Referral to Hematology / Oncology   As directed      Left RCC. CT Chest W with 3mm nodule, recommendations for repeat CT Chest W  in 2mo (around 2/10/25)    Order Comments: Left RCC. CT Chest W with 3mm nodule, recommendations for repeat CT Chest W in 2mo (around 2/10/25)             Time Spent on Discharge:  Greater than 30 minutes spent on discharge management including final examination, discussion of hospital stay and patient education, preparation of records, medication reconciliation, follow up planning      Chava Schuler MD  [facility]  12/11/24  07:02 EST

## 2024-12-11 NOTE — CASE MANAGEMENT/SOCIAL WORK
Case Management Discharge Note      Final Note: DC'd home. Yg ARRIOLA RN         Selected Continued Care - Discharged on 12/11/2024 Admission date: 12/9/2024 - Discharge disposition: Home or Self Care      Destination    No services have been selected for the patient.                Durable Medical Equipment    No services have been selected for the patient.                Dialysis/Infusion    No services have been selected for the patient.                Home Medical Care    No services have been selected for the patient.                Therapy    No services have been selected for the patient.                Community Resources    No services have been selected for the patient.                Community & DME    No services have been selected for the patient.                    Transportation Services  Private: Car    Final Discharge Disposition Code: 01 - home or self-care